# Patient Record
(demographics unavailable — no encounter records)

---

## 2025-04-02 NOTE — HISTORY OF PRESENT ILLNESS
[Follow-Up - Routine Clinic] : a routine clinic follow-up of [None] : No associated symptoms are reported [Good Sleep Hygiene] : good sleep hygiene [CPAP] : CPAP [Good Compliance] : good compliance with treatment [Good Tolerance] : good tolerance of treatment [Good Symptom Control] : good symptom control [Shortness of Breath] : Shortness of Breath

## 2025-07-29 NOTE — ASSESSMENT
[FreeTextEntry1] : 67 YOF HTN still uncontrolled. HLD, treatment increased. Coronary calcifications on chest CT. No evidence of ischemia. Normal LVEF.  Plan: Diet, weight loss discussed. Increase Benazepril to 40 mg daily. Continue Atorvastatin 40 mg daily. Repeat BW scheduled. F/u in 6 months.  Rylan Bustillos MD

## 2025-07-29 NOTE — REVIEW OF SYSTEMS
[Negative] : Heme/Lymph [Blurry Vision] : no blurred vision [Confusion] : no confusion was observed [Anxiety] : no anxiety

## 2025-07-29 NOTE — HISTORY OF PRESENT ILLNESS
[FreeTextEntry1] : 67 year old female with a PMHx of lung nodule, [place] on CPAP, HTN, HLD  Pt presents for a follow up. She is taking prescribed meds but was on vacation for 3 weeks wasnt eating healthy and drinking alcohol, BP still high, hasnt been checking at home. Denies chest pain or SOB.   Stress ECHO: 10.3 mets, normal stress echo, ef 66%  9/2024: LDL 86  TSH 0.97.